# Patient Record
Sex: MALE | Race: WHITE
[De-identification: names, ages, dates, MRNs, and addresses within clinical notes are randomized per-mention and may not be internally consistent; named-entity substitution may affect disease eponyms.]

---

## 2020-07-23 ENCOUNTER — HOSPITAL ENCOUNTER (INPATIENT)
Dept: HOSPITAL 8 - ED | Age: 20
Discharge: LEFT BEFORE DISCHARGE | DRG: 872 | End: 2020-07-23
Attending: HOSPITALIST | Admitting: HOSPITALIST
Payer: COMMERCIAL

## 2020-07-23 VITALS — SYSTOLIC BLOOD PRESSURE: 114 MMHG | DIASTOLIC BLOOD PRESSURE: 71 MMHG

## 2020-07-23 VITALS — WEIGHT: 159.17 LBS | HEIGHT: 69 IN | BODY MASS INDEX: 23.58 KG/M2

## 2020-07-23 DIAGNOSIS — Z53.29: ICD-10-CM

## 2020-07-23 DIAGNOSIS — E86.1: ICD-10-CM

## 2020-07-23 DIAGNOSIS — E87.6: ICD-10-CM

## 2020-07-23 DIAGNOSIS — F90.9: ICD-10-CM

## 2020-07-23 DIAGNOSIS — L03.114: ICD-10-CM

## 2020-07-23 DIAGNOSIS — K52.9: ICD-10-CM

## 2020-07-23 DIAGNOSIS — A41.9: Primary | ICD-10-CM

## 2020-07-23 DIAGNOSIS — E87.1: ICD-10-CM

## 2020-07-23 LAB
ALBUMIN SERPL-MCNC: 3.4 G/DL (ref 3.4–5)
ALP SERPL-CCNC: 68 U/L (ref 45–117)
ALT SERPL-CCNC: 22 U/L (ref 12–78)
ANION GAP SERPL CALC-SCNC: 5 MMOL/L (ref 5–15)
BASOPHILS # BLD AUTO: 0.01 X10^3/UL (ref 0–0.1)
BASOPHILS NFR BLD AUTO: 0 % (ref 0–1)
BILIRUB SERPL-MCNC: 1 MG/DL (ref 0.2–1)
CALCIUM SERPL-MCNC: 9.1 MG/DL (ref 8.5–10.1)
CHLORIDE SERPL-SCNC: 100 MMOL/L (ref 98–107)
CREAT SERPL-MCNC: 0.99 MG/DL (ref 0.7–1.3)
EOSINOPHIL # BLD AUTO: 0.09 X10^3/UL (ref 0–0.4)
EOSINOPHIL NFR BLD AUTO: 1 % (ref 1–7)
ERYTHROCYTE [DISTWIDTH] IN BLOOD BY AUTOMATED COUNT: 13.3 % (ref 9.4–14.8)
LYMPHOCYTES # BLD AUTO: 1.16 X10^3/UL (ref 1–3.4)
LYMPHOCYTES NFR BLD AUTO: 7 % (ref 22–44)
MCH RBC QN AUTO: 29.3 PG (ref 27.5–34.5)
MCHC RBC AUTO-ENTMCNC: 32.9 G/DL (ref 33.2–36.2)
MCV RBC AUTO: 89.1 FL (ref 81–97)
MD: (no result)
MONOCYTES # BLD AUTO: 1.46 X10^3/UL (ref 0.2–0.8)
MONOCYTES NFR BLD AUTO: 9 % (ref 2–9)
NEUTROPHILS # BLD AUTO: 12.88 X10^3/UL (ref 1.8–6.8)
NEUTROPHILS NFR BLD AUTO: 83 % (ref 42–75)
PLATELET # BLD AUTO: 299 X10^3/UL (ref 130–400)
PMV BLD AUTO: 7.3 FL (ref 7.4–10.4)
PROT SERPL-MCNC: 7.1 G/DL (ref 6.4–8.2)
RBC # BLD AUTO: 4.58 X10^6/UL (ref 4.38–5.82)

## 2020-07-23 PROCEDURE — 80053 COMPREHEN METABOLIC PANEL: CPT

## 2020-07-23 PROCEDURE — 87040 BLOOD CULTURE FOR BACTERIA: CPT

## 2020-07-23 PROCEDURE — 84145 PROCALCITONIN (PCT): CPT

## 2020-07-23 PROCEDURE — 80307 DRUG TEST PRSMV CHEM ANLYZR: CPT

## 2020-07-23 PROCEDURE — 85025 COMPLETE CBC W/AUTO DIFF WBC: CPT

## 2020-07-23 PROCEDURE — 36415 COLL VENOUS BLD VENIPUNCTURE: CPT

## 2020-07-23 PROCEDURE — 83605 ASSAY OF LACTIC ACID: CPT

## 2020-07-23 NOTE — NUR
PT UPRIGHT ON GURNEY WITH EYES CLOSED & ABLE TO DOZE OFF, RESPONDS APPROP TO 
STAFF, NAD, NO NEEDS AT THIS TIME, CALL LIGHT WITHIN REACH. UPDATED ON POC BY 
DR BUTLER.

## 2020-07-23 NOTE — NUR
CHARGE RN: PT WALKED BACK FROM LOBBY TO ROOM AT THIS TIME. STEADY UPON 
AMBULATION. NO ACUTE DISTRESS NOTED AT THIS TIME.

## 2020-07-23 NOTE — NUR
PT UPRIGHT ON GURNEY AWAKE & COMFORTABLE, RESPONDS APPROP TO STAFF, LAB AT  
FOR BLOOD CX, COMFORT MEASURES PROVIDED, CALL LIGHT WITHIN REACH.

## 2020-07-23 NOTE — NUR
PT REMAINS UPRIGHT ON GURNEY WITH EYES CLOSED & ABLE TO DOZE OFF, RESPONDS 
APPROP TO STAFF, NAD, NO NEEDS AT THIS TIME, CALL LIGHT WITHIN REACH.

## 2020-07-24 ENCOUNTER — HOSPITAL ENCOUNTER (INPATIENT)
Dept: HOSPITAL 8 - ED | Age: 20
LOS: 2 days | Discharge: HOME | DRG: 603 | End: 2020-07-26
Attending: HOSPITALIST | Admitting: INTERNAL MEDICINE
Payer: COMMERCIAL

## 2020-07-24 VITALS — SYSTOLIC BLOOD PRESSURE: 111 MMHG | DIASTOLIC BLOOD PRESSURE: 65 MMHG

## 2020-07-24 VITALS — DIASTOLIC BLOOD PRESSURE: 63 MMHG | SYSTOLIC BLOOD PRESSURE: 108 MMHG

## 2020-07-24 VITALS — BODY MASS INDEX: 24.23 KG/M2 | HEIGHT: 69 IN | WEIGHT: 163.58 LBS

## 2020-07-24 VITALS — SYSTOLIC BLOOD PRESSURE: 105 MMHG | DIASTOLIC BLOOD PRESSURE: 68 MMHG

## 2020-07-24 VITALS — SYSTOLIC BLOOD PRESSURE: 108 MMHG | DIASTOLIC BLOOD PRESSURE: 63 MMHG

## 2020-07-24 DIAGNOSIS — L03.114: Primary | ICD-10-CM

## 2020-07-24 DIAGNOSIS — D64.9: ICD-10-CM

## 2020-07-24 DIAGNOSIS — E87.6: ICD-10-CM

## 2020-07-24 DIAGNOSIS — F15.90: ICD-10-CM

## 2020-07-24 LAB
ALBUMIN SERPL-MCNC: 2.9 G/DL (ref 3.4–5)
ALP SERPL-CCNC: 60 U/L (ref 45–117)
ALT SERPL-CCNC: 22 U/L (ref 12–78)
ANION GAP SERPL CALC-SCNC: 6 MMOL/L (ref 5–15)
BASOPHILS # BLD AUTO: 0.04 X10^3/UL (ref 0–0.1)
BASOPHILS NFR BLD AUTO: 0 % (ref 0–1)
BILIRUB SERPL-MCNC: 0.5 MG/DL (ref 0.2–1)
CALCIUM SERPL-MCNC: 8.3 MG/DL (ref 8.5–10.1)
CHLORIDE SERPL-SCNC: 105 MMOL/L (ref 98–107)
CREAT SERPL-MCNC: 0.77 MG/DL (ref 0.7–1.3)
EOSINOPHIL # BLD AUTO: 0.21 X10^3/UL (ref 0–0.4)
EOSINOPHIL NFR BLD AUTO: 2 % (ref 1–7)
ERYTHROCYTE [DISTWIDTH] IN BLOOD BY AUTOMATED COUNT: 13.6 % (ref 9.4–14.8)
LYMPHOCYTES # BLD AUTO: 1.36 X10^3/UL (ref 1–3.4)
LYMPHOCYTES NFR BLD AUTO: 13 % (ref 22–44)
MCH RBC QN AUTO: 29.2 PG (ref 27.5–34.5)
MCHC RBC AUTO-ENTMCNC: 32.7 G/DL (ref 33.2–36.2)
MD: NO
MONOCYTES # BLD AUTO: 1.1 X10^3/UL (ref 0.2–0.8)
MONOCYTES NFR BLD AUTO: 11 % (ref 2–9)
NEUTROPHILS # BLD AUTO: 7.44 X10^3/UL (ref 1.8–6.8)
NEUTROPHILS NFR BLD AUTO: 73 % (ref 42–75)
PLATELET # BLD AUTO: 271 X10^3/UL (ref 130–400)
PMV BLD AUTO: 7.1 FL (ref 7.4–10.4)
PROT SERPL-MCNC: 6.4 G/DL (ref 6.4–8.2)
RBC # BLD AUTO: 4.3 X10^6/UL (ref 4.38–5.82)

## 2020-07-24 PROCEDURE — 80202 ASSAY OF VANCOMYCIN: CPT

## 2020-07-24 PROCEDURE — 80053 COMPREHEN METABOLIC PANEL: CPT

## 2020-07-24 PROCEDURE — 80048 BASIC METABOLIC PNL TOTAL CA: CPT

## 2020-07-24 PROCEDURE — 36415 COLL VENOUS BLD VENIPUNCTURE: CPT

## 2020-07-24 PROCEDURE — 85025 COMPLETE CBC W/AUTO DIFF WBC: CPT

## 2020-07-24 PROCEDURE — 96374 THER/PROPH/DIAG INJ IV PUSH: CPT

## 2020-07-24 PROCEDURE — 90715 TDAP VACCINE 7 YRS/> IM: CPT

## 2020-07-24 PROCEDURE — 96361 HYDRATE IV INFUSION ADD-ON: CPT

## 2020-07-24 PROCEDURE — 99285 EMERGENCY DEPT VISIT HI MDM: CPT

## 2020-07-24 RX ADMIN — HYDROCODONE BITARTRATE AND ACETAMINOPHEN PRN TAB: 5; 325 TABLET ORAL at 21:10

## 2020-07-24 RX ADMIN — HEPARIN SODIUM SCH UNITS: 5000 INJECTION, SOLUTION INTRAVENOUS; SUBCUTANEOUS at 21:03

## 2020-07-24 RX ADMIN — AMPICILLIN AND SULBACTAM SCH MLS/HR: 1; .5 INJECTION, POWDER, FOR SOLUTION INTRAVENOUS at 21:03

## 2020-07-24 RX ADMIN — HEPARIN SODIUM SCH UNITS: 5000 INJECTION, SOLUTION INTRAVENOUS; SUBCUTANEOUS at 08:30

## 2020-07-24 RX ADMIN — HEPARIN SODIUM SCH UNITS: 5000 INJECTION, SOLUTION INTRAVENOUS; SUBCUTANEOUS at 16:30

## 2020-07-24 RX ADMIN — HYDROCODONE BITARTRATE AND ACETAMINOPHEN PRN TAB: 5; 325 TABLET ORAL at 13:21

## 2020-07-24 RX ADMIN — AMPICILLIN AND SULBACTAM SCH MLS/HR: 1; .5 INJECTION, POWDER, FOR SOLUTION INTRAVENOUS at 15:12

## 2020-07-24 RX ADMIN — AMPICILLIN AND SULBACTAM SCH MLS/HR: 1; .5 INJECTION, POWDER, FOR SOLUTION INTRAVENOUS at 09:07

## 2020-07-24 RX ADMIN — VANCOMYCIN HYDROCHLORIDE SCH MLS/HR: 1 INJECTION, POWDER, LYOPHILIZED, FOR SOLUTION INTRAVENOUS at 16:13

## 2020-07-25 VITALS — DIASTOLIC BLOOD PRESSURE: 71 MMHG | SYSTOLIC BLOOD PRESSURE: 110 MMHG

## 2020-07-25 VITALS — DIASTOLIC BLOOD PRESSURE: 64 MMHG | SYSTOLIC BLOOD PRESSURE: 94 MMHG

## 2020-07-25 VITALS — DIASTOLIC BLOOD PRESSURE: 54 MMHG | SYSTOLIC BLOOD PRESSURE: 92 MMHG

## 2020-07-25 VITALS — SYSTOLIC BLOOD PRESSURE: 114 MMHG | DIASTOLIC BLOOD PRESSURE: 64 MMHG

## 2020-07-25 LAB
ANION GAP SERPL CALC-SCNC: 6 MMOL/L (ref 5–15)
BASOPHILS # BLD AUTO: 0.03 X10^3/UL (ref 0–0.1)
BASOPHILS NFR BLD AUTO: 0 % (ref 0–1)
CALCIUM SERPL-MCNC: 8.7 MG/DL (ref 8.5–10.1)
CHLORIDE SERPL-SCNC: 107 MMOL/L (ref 98–107)
CREAT SERPL-MCNC: 0.69 MG/DL (ref 0.7–1.3)
EOSINOPHIL # BLD AUTO: 0.22 X10^3/UL (ref 0–0.4)
EOSINOPHIL NFR BLD AUTO: 3 % (ref 1–7)
ERYTHROCYTE [DISTWIDTH] IN BLOOD BY AUTOMATED COUNT: 13.3 % (ref 9.4–14.8)
LYMPHOCYTES # BLD AUTO: 1.73 X10^3/UL (ref 1–3.4)
LYMPHOCYTES NFR BLD AUTO: 21 % (ref 22–44)
MCH RBC QN AUTO: 29.4 PG (ref 27.5–34.5)
MCHC RBC AUTO-ENTMCNC: 32.6 G/DL (ref 33.2–36.2)
MD: NO
MONOCYTES # BLD AUTO: 0.82 X10^3/UL (ref 0.2–0.8)
MONOCYTES NFR BLD AUTO: 10 % (ref 2–9)
NEUTROPHILS # BLD AUTO: 5.39 X10^3/UL (ref 1.8–6.8)
NEUTROPHILS NFR BLD AUTO: 66 % (ref 42–75)
PLATELET # BLD AUTO: 282 X10^3/UL (ref 130–400)
PMV BLD AUTO: 7.9 FL (ref 7.4–10.4)
RBC # BLD AUTO: 4.37 X10^6/UL (ref 4.38–5.82)

## 2020-07-25 RX ADMIN — AMPICILLIN AND SULBACTAM SCH MLS/HR: 1; .5 INJECTION, POWDER, FOR SOLUTION INTRAVENOUS at 15:17

## 2020-07-25 RX ADMIN — AMPICILLIN AND SULBACTAM SCH MLS/HR: 1; .5 INJECTION, POWDER, FOR SOLUTION INTRAVENOUS at 21:14

## 2020-07-25 RX ADMIN — HEPARIN SODIUM SCH UNITS: 5000 INJECTION, SOLUTION INTRAVENOUS; SUBCUTANEOUS at 06:05

## 2020-07-25 RX ADMIN — VANCOMYCIN HYDROCHLORIDE SCH MLS/HR: 1 INJECTION, POWDER, LYOPHILIZED, FOR SOLUTION INTRAVENOUS at 17:11

## 2020-07-25 RX ADMIN — VANCOMYCIN HYDROCHLORIDE SCH MLS/HR: 1 INJECTION, POWDER, LYOPHILIZED, FOR SOLUTION INTRAVENOUS at 04:24

## 2020-07-25 RX ADMIN — HEPARIN SODIUM SCH UNITS: 5000 INJECTION, SOLUTION INTRAVENOUS; SUBCUTANEOUS at 22:55

## 2020-07-25 RX ADMIN — HEPARIN SODIUM SCH UNITS: 5000 INJECTION, SOLUTION INTRAVENOUS; SUBCUTANEOUS at 15:17

## 2020-07-25 RX ADMIN — AMPICILLIN AND SULBACTAM SCH MLS/HR: 1; .5 INJECTION, POWDER, FOR SOLUTION INTRAVENOUS at 09:26

## 2020-07-25 RX ADMIN — HYDROCODONE BITARTRATE AND ACETAMINOPHEN PRN TAB: 5; 325 TABLET ORAL at 09:45

## 2020-07-25 RX ADMIN — AMPICILLIN AND SULBACTAM SCH MLS/HR: 1; .5 INJECTION, POWDER, FOR SOLUTION INTRAVENOUS at 02:53

## 2020-07-26 VITALS — SYSTOLIC BLOOD PRESSURE: 102 MMHG | DIASTOLIC BLOOD PRESSURE: 68 MMHG

## 2020-07-26 VITALS — SYSTOLIC BLOOD PRESSURE: 119 MMHG | DIASTOLIC BLOOD PRESSURE: 70 MMHG

## 2020-07-26 RX ADMIN — VANCOMYCIN HYDROCHLORIDE SCH MLS/HR: 1 INJECTION, POWDER, LYOPHILIZED, FOR SOLUTION INTRAVENOUS at 10:03

## 2020-07-26 RX ADMIN — VANCOMYCIN HYDROCHLORIDE SCH MLS/HR: 1 INJECTION, POWDER, LYOPHILIZED, FOR SOLUTION INTRAVENOUS at 01:00

## 2020-07-26 RX ADMIN — AMPICILLIN AND SULBACTAM SCH MLS/HR: 1; .5 INJECTION, POWDER, FOR SOLUTION INTRAVENOUS at 02:57

## 2020-07-26 RX ADMIN — HEPARIN SODIUM SCH UNITS: 5000 INJECTION, SOLUTION INTRAVENOUS; SUBCUTANEOUS at 06:02

## 2020-07-26 RX ADMIN — AMPICILLIN AND SULBACTAM SCH MLS/HR: 1; .5 INJECTION, POWDER, FOR SOLUTION INTRAVENOUS at 08:01

## 2020-09-11 ENCOUNTER — HOSPITAL ENCOUNTER (EMERGENCY)
Dept: HOSPITAL 8 - ED | Age: 20
Discharge: HOME | End: 2020-09-11
Payer: MEDICAID

## 2020-09-11 VITALS — BODY MASS INDEX: 22.89 KG/M2 | WEIGHT: 154.54 LBS | HEIGHT: 69 IN

## 2020-09-11 VITALS — SYSTOLIC BLOOD PRESSURE: 139 MMHG | DIASTOLIC BLOOD PRESSURE: 88 MMHG

## 2020-09-11 DIAGNOSIS — A56.01: Primary | ICD-10-CM

## 2020-09-11 DIAGNOSIS — R30.0: ICD-10-CM

## 2020-09-11 DIAGNOSIS — A56.8: ICD-10-CM

## 2020-09-11 PROCEDURE — 96372 THER/PROPH/DIAG INJ SC/IM: CPT

## 2020-09-11 PROCEDURE — 99283 EMERGENCY DEPT VISIT LOW MDM: CPT

## 2020-10-15 ENCOUNTER — HOSPITAL ENCOUNTER (EMERGENCY)
Dept: HOSPITAL 8 - ED | Age: 20
Discharge: HOME | End: 2020-10-15
Payer: MEDICAID

## 2020-10-15 VITALS — BODY MASS INDEX: 23.61 KG/M2 | HEIGHT: 69 IN | WEIGHT: 159.39 LBS

## 2020-10-15 VITALS — SYSTOLIC BLOOD PRESSURE: 120 MMHG | DIASTOLIC BLOOD PRESSURE: 69 MMHG

## 2020-10-15 DIAGNOSIS — R30.0: Primary | ICD-10-CM

## 2020-10-15 DIAGNOSIS — Z20.828: ICD-10-CM

## 2020-10-15 DIAGNOSIS — M79.10: ICD-10-CM

## 2020-10-15 DIAGNOSIS — R05: ICD-10-CM

## 2020-10-15 DIAGNOSIS — Z20.2: ICD-10-CM

## 2020-10-15 DIAGNOSIS — R94.31: ICD-10-CM

## 2020-10-15 PROCEDURE — 96372 THER/PROPH/DIAG INJ SC/IM: CPT

## 2020-10-15 PROCEDURE — 99285 EMERGENCY DEPT VISIT HI MDM: CPT

## 2020-10-15 PROCEDURE — 86592 SYPHILIS TEST NON-TREP QUAL: CPT

## 2020-10-15 PROCEDURE — 93005 ELECTROCARDIOGRAM TRACING: CPT

## 2020-10-15 PROCEDURE — 71045 X-RAY EXAM CHEST 1 VIEW: CPT

## 2020-10-15 PROCEDURE — 87635 SARS-COV-2 COVID-19 AMP PRB: CPT

## 2020-10-15 PROCEDURE — 36415 COLL VENOUS BLD VENIPUNCTURE: CPT

## 2021-01-10 ENCOUNTER — HOSPITAL ENCOUNTER (EMERGENCY)
Dept: HOSPITAL 8 - ED | Age: 21
Discharge: HOME | End: 2021-01-10
Payer: MEDICAID

## 2021-01-10 VITALS — DIASTOLIC BLOOD PRESSURE: 86 MMHG | SYSTOLIC BLOOD PRESSURE: 122 MMHG

## 2021-01-10 VITALS — HEIGHT: 69 IN | BODY MASS INDEX: 23.93 KG/M2 | WEIGHT: 161.6 LBS

## 2021-01-10 DIAGNOSIS — N50.9: Primary | ICD-10-CM

## 2021-01-10 DIAGNOSIS — R00.0: ICD-10-CM

## 2021-01-10 PROCEDURE — 86592 SYPHILIS TEST NON-TREP QUAL: CPT

## 2021-01-10 PROCEDURE — 87591 N.GONORRHOEAE DNA AMP PROB: CPT

## 2021-01-10 PROCEDURE — 87491 CHLMYD TRACH DNA AMP PROBE: CPT

## 2021-01-10 PROCEDURE — 36415 COLL VENOUS BLD VENIPUNCTURE: CPT

## 2021-01-10 PROCEDURE — 96372 THER/PROPH/DIAG INJ SC/IM: CPT

## 2021-01-10 PROCEDURE — 99283 EMERGENCY DEPT VISIT LOW MDM: CPT

## 2021-01-10 NOTE — NUR
PT MEDICATED PER EMAR. PT REPORTS HAVING HAD PENICILLIN BEFORE FOR SAME WO 
ADVERSE EFFECT. PT REFUSING OBSERVATION PERIOD POST IM MED ADMIN. DC EDUCATION 
PROVIDED, PT DEMONSTRATES UNDERSTANDING. PT AMBULATED STEADILY TO DC WITH RN

## 2021-02-10 ENCOUNTER — HOSPITAL ENCOUNTER (EMERGENCY)
Dept: HOSPITAL 8 - ED | Age: 21
Discharge: HOME | End: 2021-02-10
Payer: COMMERCIAL

## 2021-02-10 VITALS — HEIGHT: 69 IN | WEIGHT: 170.42 LBS | BODY MASS INDEX: 25.24 KG/M2

## 2021-02-10 VITALS — DIASTOLIC BLOOD PRESSURE: 76 MMHG | SYSTOLIC BLOOD PRESSURE: 130 MMHG

## 2021-02-10 DIAGNOSIS — Z20.822: ICD-10-CM

## 2021-02-10 DIAGNOSIS — K08.89: Primary | ICD-10-CM

## 2021-02-10 DIAGNOSIS — R51.9: ICD-10-CM

## 2021-02-10 DIAGNOSIS — B34.9: ICD-10-CM

## 2021-02-10 PROCEDURE — 99283 EMERGENCY DEPT VISIT LOW MDM: CPT

## 2021-02-10 PROCEDURE — 87635 SARS-COV-2 COVID-19 AMP PRB: CPT

## 2024-01-07 ENCOUNTER — HOSPITAL ENCOUNTER (EMERGENCY)
Facility: MEDICAL CENTER | Age: 24
End: 2024-01-07
Attending: EMERGENCY MEDICINE

## 2024-01-07 VITALS
BODY MASS INDEX: 30.4 KG/M2 | SYSTOLIC BLOOD PRESSURE: 143 MMHG | TEMPERATURE: 98.4 F | WEIGHT: 205.25 LBS | OXYGEN SATURATION: 95 % | DIASTOLIC BLOOD PRESSURE: 91 MMHG | HEIGHT: 69 IN | HEART RATE: 83 BPM | RESPIRATION RATE: 14 BRPM

## 2024-01-07 DIAGNOSIS — K08.89 PAIN, DENTAL: ICD-10-CM

## 2024-01-07 PROCEDURE — A9270 NON-COVERED ITEM OR SERVICE: HCPCS | Performed by: EMERGENCY MEDICINE

## 2024-01-07 PROCEDURE — 700102 HCHG RX REV CODE 250 W/ 637 OVERRIDE(OP): Performed by: EMERGENCY MEDICINE

## 2024-01-07 PROCEDURE — 99283 EMERGENCY DEPT VISIT LOW MDM: CPT

## 2024-01-07 RX ORDER — AMOXICILLIN 500 MG/1
500 CAPSULE ORAL 3 TIMES DAILY
Qty: 30 CAPSULE | Refills: 0 | Status: ACTIVE | OUTPATIENT
Start: 2024-01-07

## 2024-01-07 RX ORDER — IBUPROFEN 600 MG/1
600 TABLET ORAL ONCE
Status: COMPLETED | OUTPATIENT
Start: 2024-01-07 | End: 2024-01-07

## 2024-01-07 RX ORDER — AMOXICILLIN 500 MG/1
500 CAPSULE ORAL 3 TIMES DAILY
Qty: 30 CAPSULE | Refills: 0 | Status: ACTIVE | OUTPATIENT
Start: 2024-01-07 | End: 2024-01-07

## 2024-01-07 RX ORDER — ACETAMINOPHEN 500 MG
1000 TABLET ORAL ONCE
Status: COMPLETED | OUTPATIENT
Start: 2024-01-07 | End: 2024-01-07

## 2024-01-07 RX ADMIN — IBUPROFEN 600 MG: 600 TABLET, FILM COATED ORAL at 09:35

## 2024-01-07 RX ADMIN — ACETAMINOPHEN 1000 MG: 500 TABLET ORAL at 09:35

## 2024-01-07 NOTE — ED TRIAGE NOTES
"Chief Complaint   Patient presents with    Dental Pain     L lower jaw x 2 days. 400mg ASA at 530 am.       [place] on L lower jaw broke recently.     BP (!) 141/94   Pulse 68   Temp 36.6 °C (97.8 °F) (Temporal)   Resp 18   Ht 1.753 m (5' 9\")   SpO2 96%    "

## 2024-01-07 NOTE — ED NOTES
PT ambulated with steady gait from ED triage room to Ortho 1. No changes since triage. PT states jaw pain has been worsening over last 2 days and is currently 7/10 (0-10 numeric scale). PT denies fever or presence of foul smelling odors in mouth. Allergies reviewed. PT resting in position of comfort at this time. Chart up for ERP.   
PT given AVS documentation. PT verbally acknowledges understanding of discharge instructions. VSS. All belongings accounted for by PT. PT ambulated to ED lobby with steady gait.    
No

## 2024-01-07 NOTE — ED PROVIDER NOTES
"ER Provider Note    Scribed for Kehinde Raymundo D.O. by Andrea Carrion. 1/7/2024  9:24 AM    Primary Care Provider: No primary care provider noted.    CHIEF COMPLAINT  Chief Complaint   Patient presents with    Dental Pain     L lower jaw x 2 days. 400mg ASA at 530 am.        HPI/ROS    Lexa Colvin is a 23 y.o. male who presents to the Emergency Department for evaluation of dental pain onset 2 days ago. The patient reports his tooth on his left lower jaw has been cracked for about a year now, but he states the crown broke recently. The patient denies nausea, vomiting, or fevers. The patient reports taking Aspirin for the pain, noting his last dose was at 5:30 AM this morning. The patient has no known allergies.     ROS as per HPI.    PAST MEDICAL HISTORY  History reviewed. No pertinent past medical history.    SURGICAL HISTORY  History reviewed. No pertinent surgical history.    FAMILY HISTORY  History reviewed. No pertinent family history.    SOCIAL HISTORY   reports that he has never smoked. He has never used smokeless tobacco. He reports current alcohol use. He reports that he does not currently use drugs.    CURRENT MEDICATIONS  Discharge Medication List as of 1/7/2024  9:41 AM          ALLERGIES  Patient has no known allergies.    PHYSICAL EXAM  BP (!) 143/91   Pulse 83   Temp 36.9 °C (98.4 °F) (Temporal)   Resp 14   Ht 1.753 m (5' 9\")   Wt 93.1 kg (205 lb 4 oz)   SpO2 95%   BMI 30.31 kg/m²     Constitutional: Alert in no apparent distress.  HENT: Normocephalic, Atraumatic, mucous membranes are moist. Left lower premolar has significant cavitary lesion. Mild surrounding swelling and erythema. No abscess.   Eyes: Conjunctiva normal, non-icteric.   Skin: Warm, Dry, normal color.   Neurologic: Alert, Grossly non-focal.   Psychiatric: Affect normal, Judgment normal, Mood normal, Appears appropriate and not intoxicated.       EXTERNAL RECORDS REVIEWED  Review of patient's past medical records show no ER " visits for dental concers. .     INITIAL ASSESSMENT  Patient has obvious cavitary lesion, probably gingivitis no abscess. No palpable or drainable abscess at this time. Will treat for pain and give antibiotics.     ED Observation Status? No; Patient does not meet criteria for ED Observation.         COURSE & MEDICAL DECISION MAKING     COURSE AND PLAN  9:24 AM - Patient seen and examined at bedside. I informed the patient of my plan to discharge, and I will be prescribing him antibiotics. Patient was instructed to take the full course of antibiotics, and to return to [facility] ED for new or worsening symptoms. I informed the patient I will be placing a referral to oral surgery. I advised the patient to take Tylenol as needed for the pain, ad to       ED Summary: Patient presents with dental pain.  There is no obvious abscess, he is given Motrin and Tylenol here, is mildly improved his pain.  The patient was discharged home with antibiotics and symptomatic care.  I have a referral for oral surgery was placed.    Decision tools and prescription drugs considered including, but not limited to: Antibiotics         The patient will return for new or worsening symptoms and is stable at the time of discharge.    The patient is referred to a primary physician for blood pressure management, diabetic screening, and for all other preventative health concerns.      DISPOSITION:  Patient will be discharged home in stable condition.    FOLLOW UP:  Jose Garcia D.D.S.  [id number] [facility] [id number] [phone]    In 1 day        OUTPATIENT MEDICATIONS:  Discharge Medication List as of 1/7/2024  9:41 AM        START taking these medications    Details   amoxicillin (AMOXIL) 500 MG Cap Take 1 Capsule by mouth 3 times a day., Disp-30 Capsule, R-0, Print               FINAL DIAGNOSIS  1. Pain, dental        I, Andrea Carrion (Scribe), am scribing for, and in the presence of, Kehinde Raymundo D.O..    Electronically signed by:  Andrea Carrion (Scribe), 1/7/2024    IKehinde D.O. personally performed the services described in this documentation, as scribed by Andrea Carrion in my presence, and it is both accurate and complete.     The note accurately reflects work and decisions made by me.  Kehinde Raymundo D.O.  1/7/2024  2:58 PM